# Patient Record
Sex: MALE | Race: WHITE | NOT HISPANIC OR LATINO
[De-identification: names, ages, dates, MRNs, and addresses within clinical notes are randomized per-mention and may not be internally consistent; named-entity substitution may affect disease eponyms.]

---

## 2017-02-22 ENCOUNTER — APPOINTMENT (OUTPATIENT)
Dept: SPINE | Facility: HOSPITAL | Age: 47
End: 2017-02-22

## 2017-09-26 ENCOUNTER — APPOINTMENT (OUTPATIENT)
Dept: SPINE | Facility: CLINIC | Age: 47
End: 2017-09-26

## 2018-03-01 ENCOUNTER — OUTPATIENT (OUTPATIENT)
Dept: OUTPATIENT SERVICES | Facility: HOSPITAL | Age: 48
LOS: 1 days | End: 2018-03-01
Payer: MEDICAID

## 2018-03-09 DIAGNOSIS — R69 ILLNESS, UNSPECIFIED: ICD-10-CM

## 2018-04-01 PROCEDURE — G9001: CPT

## 2018-07-01 ENCOUNTER — OUTPATIENT (OUTPATIENT)
Dept: OUTPATIENT SERVICES | Facility: HOSPITAL | Age: 48
LOS: 1 days | End: 2018-07-01

## 2018-07-17 DIAGNOSIS — Z71.89 OTHER SPECIFIED COUNSELING: ICD-10-CM

## 2019-06-17 ENCOUNTER — EMERGENCY (EMERGENCY)
Facility: HOSPITAL | Age: 49
LOS: 1 days | Discharge: LEFT BEFORE DISCHARGE | End: 2019-06-17

## 2019-06-17 VITALS — WEIGHT: 220.02 LBS | HEIGHT: 74 IN

## 2019-06-17 VITALS
SYSTOLIC BLOOD PRESSURE: 138 MMHG | HEART RATE: 61 BPM | TEMPERATURE: 98 F | DIASTOLIC BLOOD PRESSURE: 80 MMHG | RESPIRATION RATE: 18 BRPM | OXYGEN SATURATION: 97 %

## 2019-06-17 NOTE — ED ADULT TRIAGE NOTE - CHIEF COMPLAINT QUOTE
"I was on medication (Amoxicillin) for a right ear infection and it wasn't doing anything now I have a sore throat and I am, having a  withdrawal  from Suboxone" Pt A & OX4.

## 2019-06-21 ENCOUNTER — EMERGENCY (EMERGENCY)
Facility: HOSPITAL | Age: 49
LOS: 1 days | Discharge: HOME | End: 2019-06-21
Attending: EMERGENCY MEDICINE
Payer: MEDICAID

## 2019-06-21 VITALS
HEIGHT: 74 IN | OXYGEN SATURATION: 99 % | SYSTOLIC BLOOD PRESSURE: 161 MMHG | WEIGHT: 210.1 LBS | HEART RATE: 81 BPM | DIASTOLIC BLOOD PRESSURE: 93 MMHG | RESPIRATION RATE: 18 BRPM | TEMPERATURE: 98 F

## 2019-06-21 PROCEDURE — 99282 EMERGENCY DEPT VISIT SF MDM: CPT
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
## 2019-06-21 NOTE — ED STATDOCS - CARE PLAN
Daughters/family/significant other Principal Discharge DX:	Right ear pain  Secondary Diagnosis:	Medication discontinued without order

## 2019-06-21 NOTE — ED STATDOCS - ENMT, MLM
Airway patent. Nasal mucosa clear.  Mouth with normal mucosa. Neck supple, FROM. Airway patent. Nasal mucosa clear.  Mouth with normal mucosa. Neck supple, FROM. L TM clear, R TM healing. Airway patent. . L TM clear, R TM healing.

## 2019-06-21 NOTE — ED STATDOCS - PROGRESS NOTE DETAILS
discussed with [facility] np and sw  pt off meds 1 week already so withdrawal is complete  even if he recv'd one dose(24 mg is high for maintenance) then he has no access tomorrow for a dose  asked to give a tox screen and refused  discharge
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
